# Patient Record
Sex: MALE | Race: WHITE | NOT HISPANIC OR LATINO | Employment: FULL TIME | URBAN - METROPOLITAN AREA
[De-identification: names, ages, dates, MRNs, and addresses within clinical notes are randomized per-mention and may not be internally consistent; named-entity substitution may affect disease eponyms.]

---

## 2019-04-16 ENCOUNTER — OFFICE VISIT (OUTPATIENT)
Dept: FAMILY MEDICINE | Facility: CLINIC | Age: 29
End: 2019-04-16
Payer: COMMERCIAL

## 2019-04-16 VITALS — TEMPERATURE: 98.7 F | HEIGHT: 72 IN

## 2019-04-16 DIAGNOSIS — Z71.84 COUNSELING ABOUT TRAVEL: Primary | ICD-10-CM

## 2019-04-16 PROCEDURE — 99402 PREV MED CNSL INDIV APPRX 30: CPT | Mod: 25 | Performed by: FAMILY MEDICINE

## 2019-04-16 PROCEDURE — 90471 IMMUNIZATION ADMIN: CPT | Mod: GA | Performed by: FAMILY MEDICINE

## 2019-04-16 PROCEDURE — 90717 YELLOW FEVER VACCINE SUBQ: CPT | Mod: GA | Performed by: FAMILY MEDICINE

## 2019-04-16 RX ORDER — ALPRAZOLAM 1 MG
1 TABLET ORAL
COMMUNITY
Start: 2019-01-18

## 2019-04-16 RX ORDER — AZITHROMYCIN 500 MG/1
TABLET, FILM COATED ORAL
Qty: 3 TABLET | Refills: 0 | Status: SHIPPED | OUTPATIENT
Start: 2019-04-16

## 2019-04-16 NOTE — NURSING NOTE
Chief Complaint   Patient presents with     [facility]     Temp 98.7  F (37.1  C) (Oral)   Ht 1.829 m (6')  There is no height or weight on file to calculate BMI.        Health Maintenance due pending provider review:  NONE    n/a    Annel Lewis CMA

## 2021-08-15 ENCOUNTER — HEALTH MAINTENANCE LETTER (OUTPATIENT)
Age: 31
End: 2021-08-15

## 2021-10-10 ENCOUNTER — HEALTH MAINTENANCE LETTER (OUTPATIENT)
Age: 31
End: 2021-10-10

## 2022-09-18 ENCOUNTER — HEALTH MAINTENANCE LETTER (OUTPATIENT)
Age: 32
End: 2022-09-18

## 2023-08-07 ENCOUNTER — APPOINTMENT (OUTPATIENT)
Dept: RADIOLOGY | Facility: CLINIC | Age: 33
End: 2023-08-07
Attending: EMERGENCY MEDICINE
Payer: COMMERCIAL

## 2023-08-07 ENCOUNTER — HOSPITAL ENCOUNTER (EMERGENCY)
Facility: CLINIC | Age: 33
Discharge: HOME | End: 2023-08-07
Attending: EMERGENCY MEDICINE | Admitting: EMERGENCY MEDICINE
Payer: COMMERCIAL

## 2023-08-07 VITALS
HEIGHT: 73 IN | OXYGEN SATURATION: 100 % | SYSTOLIC BLOOD PRESSURE: 129 MMHG | BODY MASS INDEX: 25.18 KG/M2 | RESPIRATION RATE: 16 BRPM | TEMPERATURE: 98.1 F | HEART RATE: 55 BPM | DIASTOLIC BLOOD PRESSURE: 62 MMHG | WEIGHT: 190 LBS

## 2023-08-07 DIAGNOSIS — R09.1 PLEURISY: ICD-10-CM

## 2023-08-07 LAB
ANION GAP SERPL CALCULATED.3IONS-SCNC: 8 MMOL/L (ref 5–18)
ATRIAL RATE - MUSE: 66 BPM
BUN SERPL-MCNC: 14 MG/DL (ref 8–22)
CALCIUM SERPL-MCNC: 8.9 MG/DL (ref 8.5–10.5)
CHLORIDE BLD-SCNC: 107 MMOL/L (ref 98–107)
CO2 SERPL-SCNC: 25 MMOL/L (ref 22–31)
CREAT SERPL-MCNC: 1.11 MG/DL (ref 0.7–1.3)
D DIMER PPP FEU-MCNC: <=0.27 UG/ML FEU (ref 0–0.5)
DIASTOLIC BLOOD PRESSURE - MUSE: NORMAL MMHG
ERYTHROCYTE [DISTWIDTH] IN BLOOD BY AUTOMATED COUNT: 11.6 % (ref 10–15)
GFR SERPL CREATININE-BSD FRML MDRD: 89 ML/MIN/1.73M2
GLUCOSE BLD-MCNC: 88 MG/DL (ref 70–125)
HCT VFR BLD AUTO: 38.6 % (ref 40–53)
HGB BLD-MCNC: 13.6 G/DL (ref 13.3–17.7)
INTERPRETATION ECG - MUSE: NORMAL
MCH RBC QN AUTO: 33.1 PG (ref 26.5–33)
MCHC RBC AUTO-ENTMCNC: 35.2 G/DL (ref 31.5–36.5)
MCV RBC AUTO: 94 FL (ref 78–100)
P AXIS - MUSE: 53 DEGREES
PLATELET # BLD AUTO: 212 10E3/UL (ref 150–450)
POTASSIUM BLD-SCNC: 4.1 MMOL/L (ref 3.5–5)
PR INTERVAL - MUSE: 158 MS
QRS DURATION - MUSE: 110 MS
QT - MUSE: 406 MS
QTC - MUSE: 425 MS
R AXIS - MUSE: 73 DEGREES
RBC # BLD AUTO: 4.11 10E6/UL (ref 4.4–5.9)
SODIUM SERPL-SCNC: 140 MMOL/L (ref 136–145)
SYSTOLIC BLOOD PRESSURE - MUSE: NORMAL MMHG
T AXIS - MUSE: 42 DEGREES
TROPONIN I SERPL-MCNC: <0.01 NG/ML (ref 0–0.29)
VENTRICULAR RATE- MUSE: 66 BPM
WBC # BLD AUTO: 5.8 10E3/UL (ref 4–11)

## 2023-08-07 PROCEDURE — 82374 ASSAY BLOOD CARBON DIOXIDE: CPT | Performed by: EMERGENCY MEDICINE

## 2023-08-07 PROCEDURE — 36415 COLL VENOUS BLD VENIPUNCTURE: CPT | Performed by: EMERGENCY MEDICINE

## 2023-08-07 PROCEDURE — 85379 FIBRIN DEGRADATION QUANT: CPT | Performed by: EMERGENCY MEDICINE

## 2023-08-07 PROCEDURE — 93005 ELECTROCARDIOGRAM TRACING: CPT | Performed by: EMERGENCY MEDICINE

## 2023-08-07 PROCEDURE — 99285 EMERGENCY DEPT VISIT HI MDM: CPT | Mod: 25

## 2023-08-07 PROCEDURE — 71046 X-RAY EXAM CHEST 2 VIEWS: CPT

## 2023-08-07 PROCEDURE — 82435 ASSAY OF BLOOD CHLORIDE: CPT | Performed by: EMERGENCY MEDICINE

## 2023-08-07 PROCEDURE — 84484 ASSAY OF TROPONIN QUANT: CPT | Performed by: EMERGENCY MEDICINE

## 2023-08-07 PROCEDURE — 85014 HEMATOCRIT: CPT | Performed by: EMERGENCY MEDICINE

## 2023-08-07 ASSESSMENT — ACTIVITIES OF DAILY LIVING (ADL): ADLS_ACUITY_SCORE: 33

## 2023-08-07 NOTE — ED PROVIDER NOTES
EMERGENCY DEPARTMENT ENCOUNTER      NAME: Louis De Paz  AGE: 32 year old male  YOB: 1990  MRN: [id number]  EVALUATION DATE & TIME: 8/7/2023  2:35 PM    PCP: Clare Olivera    ED PROVIDER: Radha Bradford MD    Chief Complaint   Patient presents with    Chest Pain     Left-sided below ribcage         FINAL IMPRESSION:  1. Pleurisy          ED COURSE & MEDICAL DECISION MAKING:    Pertinent Labs & Imaging studies reviewed. (See chart for details)  32 year old male with history of anxiety who presents to the Emergency Department for evaluation of 5 days of left-sided pleuritic chest pain.  Differential includes pleurisy, PE.  Less likely pneumothorax, pneumonia, ACS.    Patient initially seen evaluate by myself in triage area due to boarding crisis.  Twelve-lead EKG obtained showing sinus rhythm.  No ischemic changes.  CBC, BMP, D-dimer and troponin unremarkable.  Chest x-ray unremarkable.  Reassured counseled and discharged home with return precautions.      ED Course as of 08/07/23 1603   Mon Aug 07, 2023   1400 Met with patient for initial interview and exam. Discussed initial plan for care for their stay in the emergency department.     1513 D-Dimer Quantitative: <=0.27   1532 Troponin I: <0.01   1602 Chest XR,  PA & LAT  Chest x-ray independently interpreted by myself, no cardiomegaly infiltrate effusion, pneumothorax       Medical Decision Making    History:  Supplemental history from: Documented in chart, if applicable and N/A  External Record(s) reviewed: Nurse triage line today    Work Up:  Chart documentation includes differential considered and any EKGs or imaging independently interpreted by provider, see MDM  In additional to work up documented, I considered the following work up: see MDM    External consultation:  Discussion of management with another provider: N/A    Complicating factors:  Care impacted by chronic illness: Mental Health  Care affected by social determinants of  health: Access to care referred to ED    Disposition considerations: Discharge. No recommendations on prescription strength medication(s). See documentation for any additional details.        At the conclusion of the encounter I discussed the results of all of the tests and the disposition. The questions were answered. The patient or family acknowledged understanding and was agreeable with the care plan.        MEDICATIONS GIVEN IN THE EMERGENCY:  Medications - No data to display    NEW PRESCRIPTIONS STARTED AT TODAY'S ER VISIT  New Prescriptions    No medications on file          =================================================================    HPI    Patient information was obtained from: patient    Use of Intrepreter: N/A        Louis De Paz is a 32 year old male with pertinent medical history of anxiety who presents to the ED via walk in for an evaluation of chest pain     On Wednesday (~ 5 days ago) the patient has endorsed the sudden onset of persistent left lower chest pain just above his rib. He states that he endorses intermittent pulsing sharp dagger like pain. The patient reports these episodes occur a few times a day and last for a few minutes. When these sharp pains are not occurring he states that his chest otherwise feels tight. Initially, he attributed his pain to heartburn but the pain has persisted which prompted him to the ED.     The patient denies any pain medication for his symptoms prior to ED arrival. He denies a history of blood clots or bleeding disorders. The patient denies any protein C or F deficiency. He denies any recent surgeries or travel. He denies any smoking. The patient denies any lightheadedness, dizziness, and shortness of breath.     PAST MEDICAL HISTORY:  No past medical history on file.    PAST SURGICAL HISTORY:  No past surgical history on file.    CURRENT MEDICATIONS:    Prior to Admission Medications   Prescriptions Last Dose Informant Patient Reported? Taking?  "  ALPRAZolam (XANAX) 1 MG tablet   Yes No   Sig: Take 1 mg by mouth   azithromycin (ZITHROMAX) 500 MG tablet   No No   Sig: Take one tablet daily for up to 3 days as needed for traveler's diarrhea      Facility-Administered Medications: None       ALLERGIES:  Allergies   Allergen Reactions    Penicillins      Other reaction(s): Throat Swelling/Closing    Pseudoephedrine      Other reaction(s): Throat Swelling/Closing       FAMILY HISTORY:  No family history on file.    SOCIAL HISTORY:        VITALS:  Patient Vitals for the past 24 hrs:   BP Temp Temp src Pulse Resp SpO2 Height Weight   08/07/23 1440 129/62 -- -- 55 -- 100 % -- --   08/07/23 1335 (!) 148/83 98.1  F (36.7  C) Temporal 75 16 98 % 1.854 m (6' 1\") 86.2 kg (190 lb)       PHYSICAL EXAM    General Appearance: Well-appearing, well-nourished, no acute distress   Neck:  Supple  Chest:  No tenderness or deformity, no crepitus  Cardio:  Regular rate and rhythm, no murmur/gallop/rub, 2+ pulses symmetric in all extremities  Pulm:  No respiratory distress, clear to auscultation bilaterally  Abdomen:  Soft, non-tender, non distended,no rebound or guarding.  Extremities: Moves extremities normally, normal gait.  No peripheral edema  Neuro:  Alert and oriented ×3     RADIOLOGY/LABS:  Reviewed all pertinent imaging. Please see official radiology report. All pertinent labs reviewed and interpreted.    Results for orders placed or performed during the hospital encounter of 08/07/23   CBC (+ platelets, no diff)   Result Value Ref Range    WBC Count 5.8 4.0 - 11.0 10e3/uL    RBC Count 4.11 (L) 4.40 - 5.90 10e6/uL    Hemoglobin 13.6 13.3 - 17.7 g/dL    Hematocrit 38.6 (L) 40.0 - 53.0 %    MCV 94 78 - 100 fL    MCH 33.1 (H) 26.5 - 33.0 pg    MCHC 35.2 31.5 - 36.5 g/dL    RDW 11.6 10.0 - 15.0 %    Platelet Count 212 150 - 450 10e3/uL   Basic metabolic panel   Result Value Ref Range    Sodium 140 136 - 145 mmol/L    Potassium 4.1 3.5 - 5.0 mmol/L    Chloride 107 98 - 107 " mmol/L    Carbon Dioxide (CO2) 25 22 - 31 mmol/L    Anion Gap 8 5 - 18 mmol/L    Urea Nitrogen 14 8 - 22 mg/dL    Creatinine 1.11 0.70 - 1.30 mg/dL    Calcium 8.9 8.5 - 10.5 mg/dL    Glucose 88 70 - 125 mg/dL    GFR Estimate 89 >60 mL/min/1.73m2   D dimer quantitative   Result Value Ref Range    D-Dimer Quantitative <=0.27 0.00 - 0.50 ug/mL FEU   Troponin I (now)   Result Value Ref Range    Troponin I <0.01 0.00 - 0.29 ng/mL   ECG 12-LEAD WITH MUSE ([facility])   Result Value Ref Range    Systolic Blood Pressure  mmHg    Diastolic Blood Pressure  mmHg    Ventricular Rate 66 BPM    Atrial Rate 66 BPM    NJ Interval 158 ms    QRS Duration 110 ms     ms    QTc 425 ms    P Axis 53 degrees    R AXIS 73 degrees    T Axis 42 degrees    Interpretation ECG       Sinus rhythm  Normal ECG  No previous ECGs available  Confirmed by SEE ED PROVIDER NOTE FOR, ECG INTERPRETATION ([id number]),  Areli Santos ([id number]) on 8/7/2023 3:43:48 PM         EKG:  Performed at: 13:40    Impression: Sinus rhythm    Rate: 66  Rhythm: Sinus rhythm  Axis: None  NJ Interval: 158  QRS Interval: 110  QTc Interval: 425  ST Changes: None  Comparison: None  I have independently reviewed and interpreted the EKG(s) documented above.      The creation of this record is based on the scribe s observations of the work being performed by Radha Bradford MD and the provider s statements to them. It was created on her behalf by Ama Jackson, a trained medical scribe. This document has been checked and approved by the attending provider.    Radha Bradford MD  Emergency Medicine  [facility] EMERGENCY ROOM  [address] [id number] [phone]  Dept: [phone]       Radha Bradford MD  08/07/23 6806

## 2023-08-07 NOTE — ED TRIAGE NOTES
"The patient presents to the ED with c/o left-side chest pain (below ribcage) since Wednesday. Reports the pain comes and goes and describes as a squeezing sensation with \"dagger-like\" pains.      Triage Assessment       Row Name 08/07/23 7044       Triage Assessment (Adult)    Airway WDL WDL       Respiratory WDL    Respiratory WDL WDL       Skin Circulation/Temperature WDL    Skin Circulation/Temperature WDL WDL       Cardiac WDL    Cardiac WDL X       Peripheral/Neurovascular WDL    Peripheral Neurovascular WDL WDL       Cognitive/Neuro/Behavioral WDL    Cognitive/Neuro/Behavioral WDL WDL                    "

## 2024-02-25 ENCOUNTER — HEALTH MAINTENANCE LETTER (OUTPATIENT)
Age: 34
End: 2024-02-25

## 2025-03-15 ENCOUNTER — HEALTH MAINTENANCE LETTER (OUTPATIENT)
Age: 35
End: 2025-03-15